# Patient Record
Sex: MALE | Race: WHITE
[De-identification: names, ages, dates, MRNs, and addresses within clinical notes are randomized per-mention and may not be internally consistent; named-entity substitution may affect disease eponyms.]

---

## 2018-06-19 ENCOUNTER — HOSPITAL ENCOUNTER (OUTPATIENT)
Dept: HOSPITAL 17 - CPRE | Age: 78
End: 2018-06-19
Attending: SURGERY
Payer: MEDICARE

## 2018-06-19 DIAGNOSIS — Z01.810: ICD-10-CM

## 2018-06-19 DIAGNOSIS — M79.609: ICD-10-CM

## 2018-06-19 DIAGNOSIS — R00.1: ICD-10-CM

## 2018-06-19 DIAGNOSIS — Z01.812: Primary | ICD-10-CM

## 2018-06-19 LAB
ALBUMIN SERPL-MCNC: 4 GM/DL (ref 3.4–5)
ALP SERPL-CCNC: 76 U/L (ref 45–117)
ALT SERPL-CCNC: 19 U/L (ref 12–78)
AST SERPL-CCNC: 11 U/L (ref 15–37)
BILIRUB SERPL-MCNC: 0.6 MG/DL (ref 0.2–1)
BUN SERPL-MCNC: 13 MG/DL (ref 7–18)
CALCIUM SERPL-MCNC: 8.9 MG/DL (ref 8.5–10.1)
CHLORIDE SERPL-SCNC: 107 MEQ/L (ref 98–107)
COLOR UR: YELLOW
CREAT SERPL-MCNC: 0.89 MG/DL (ref 0.6–1.3)
ERYTHROCYTE [DISTWIDTH] IN BLOOD BY AUTOMATED COUNT: 12.6 % (ref 11.6–17.2)
GFR SERPLBLD BASED ON 1.73 SQ M-ARVRAT: 83 ML/MIN (ref 89–?)
GLUCOSE UR STRIP-MCNC: (no result) MG/DL
HCO3 BLD-SCNC: 24.8 MEQ/L (ref 21–32)
HCT VFR BLD CALC: 39.8 % (ref 39–51)
HGB BLD-MCNC: 13.7 GM/DL (ref 13–17)
HGB UR QL STRIP: (no result)
INR PPP: 1.1 RATIO
KETONES UR STRIP-MCNC: (no result) MG/DL
MCH RBC QN AUTO: 31.4 PG (ref 27–34)
MCHC RBC AUTO-ENTMCNC: 34.4 % (ref 32–36)
MCV RBC AUTO: 91.3 FL (ref 80–100)
MUCOUS THREADS #/AREA URNS LPF: (no result) /LPF
NITRITE UR QL STRIP: (no result)
PLATELET # BLD: 252 TH/MM3 (ref 150–450)
PMV BLD AUTO: 8.1 FL (ref 7–11)
PROT SERPL-MCNC: 7.2 GM/DL (ref 6.4–8.2)
PROTHROMBIN TIME: 10.9 SEC (ref 9.8–11.6)
RBC # BLD AUTO: 4.36 MIL/MM3 (ref 4.5–5.9)
SODIUM SERPL-SCNC: 142 MEQ/L (ref 136–145)
SP GR UR STRIP: 1.01 (ref 1–1.03)
URINE LEUKOCYTE ESTERASE: (no result)
WBC # BLD AUTO: 8 TH/MM3 (ref 4–11)

## 2018-06-19 PROCEDURE — 85610 PROTHROMBIN TIME: CPT

## 2018-06-19 PROCEDURE — 80053 COMPREHEN METABOLIC PANEL: CPT

## 2018-06-19 PROCEDURE — 36415 COLL VENOUS BLD VENIPUNCTURE: CPT

## 2018-06-19 PROCEDURE — 81001 URINALYSIS AUTO W/SCOPE: CPT

## 2018-06-19 PROCEDURE — 85027 COMPLETE CBC AUTOMATED: CPT

## 2018-06-19 PROCEDURE — 85730 THROMBOPLASTIN TIME PARTIAL: CPT

## 2018-06-19 PROCEDURE — 93005 ELECTROCARDIOGRAM TRACING: CPT

## 2018-06-20 NOTE — MH
cc:

IRVIN Rodriguez MD

****

 

 

DATE OF ADMISSION:

06/25/2018

 

ADMITTING DIAGNOSIS:

Osteoarthritic degeneration of left hip, now being admitted for left 

total hip arthroplasty.

 

HISTORY OF PRESENT ILLNESS:

This pleasant 78-year-old male is being admitted today for left total 

hip arthroplasty due to severe painful osteoarthritic degeneration, 

left hip.

 

OTHER PAST HISTORY:

He recently underwent bilateral total knees as well.  He has a history

of hyperlipidemia, essential primary hypertension.

 

CURRENT MEDICATIONS:

Diazepam 5 mg tablet, doxazosin, diltiazem, simvastatin, Prepopik.

 

PREVIOUS SURGERIES:

The total knees.

 

REVIEW OF SYSTEMS:

Noncontributory.

 

FAMILY HISTORY:

Noncontributory.

 

SOCIAL HISTORY:

Does not smoke or drink.

 

ALLERGIES:

HE IS ALLERGIC TO GEMFIBROZIL AND PENICILLIN.

 

PHYSICAL EXAMINATION:

GENERAL:  We find a 78-year-old male, well developed, well nourished, 

oriented x 3, complaining of pain in his left hip.

VITAL SIGNS:  Blood pressure 130/80, pulse 60 and regular, 

respirations 16, temperature 98, pulse oximetry 98% on room air.

HEENT:  Eyes PERRLA, EOMI.  Ears, nose, mouth clear.

NECK:  Supple.

LUNGS:  Clear.

HEART:  Regular rate.

ABDOMEN:  Soft, positive bowel sounds, nontender.

EXTREMITIES:  Reveal his left hip to have decreased range of motion 

with crepitance on range of motion.  Neurovascularly intact to his 

toes.

 

IMPRESSION:

Severe painful osteoarthritic degeneration, left hip.

 

PLAN:

Admission for left total hip arthroplasty today.  The patient given 

prescription for postoperative pain and anticoagulation control in the

office.  Plans on going home with home health care after surgical stay

in the hospital.

 

 

__________________________________

MD JASON Jacques/ISELA

D: 06/20/2018, 03:26 PM

T: 06/20/2018, 03:48 PM

Visit #: [id number]

Job #: [id number]

## 2018-06-20 NOTE — EKG
Date Performed: 06/19/2018       Time Performed: 09:12:36

 

PTAGE:      78 years

 

EKG:      SINUS BRADYCARDIA BORDERLINE ECG 

 

NO PREVIOUS TRACING            

 

DOCTOR:   Mickey Cervantes  Interpretating Date/Time  06/20/2018 14:13:31

## 2018-06-25 ENCOUNTER — HOSPITAL ENCOUNTER (INPATIENT)
Dept: HOSPITAL 17 - HSDI | Age: 78
LOS: 1 days | Discharge: HOME HEALTH | DRG: 470 | End: 2018-06-26
Attending: SURGERY | Admitting: SURGERY
Payer: MEDICARE

## 2018-06-25 VITALS
RESPIRATION RATE: 18 BRPM | DIASTOLIC BLOOD PRESSURE: 79 MMHG | OXYGEN SATURATION: 96 % | TEMPERATURE: 98.5 F | HEART RATE: 81 BPM | SYSTOLIC BLOOD PRESSURE: 155 MMHG

## 2018-06-25 VITALS
RESPIRATION RATE: 18 BRPM | OXYGEN SATURATION: 97 % | DIASTOLIC BLOOD PRESSURE: 81 MMHG | SYSTOLIC BLOOD PRESSURE: 151 MMHG | TEMPERATURE: 98.6 F | HEART RATE: 79 BPM

## 2018-06-25 VITALS — HEIGHT: 71 IN | WEIGHT: 228.4 LBS | BODY MASS INDEX: 31.98 KG/M2

## 2018-06-25 VITALS
TEMPERATURE: 97.4 F | DIASTOLIC BLOOD PRESSURE: 83 MMHG | SYSTOLIC BLOOD PRESSURE: 169 MMHG | OXYGEN SATURATION: 94 % | RESPIRATION RATE: 14 BRPM | HEART RATE: 65 BPM

## 2018-06-25 DIAGNOSIS — E78.5: ICD-10-CM

## 2018-06-25 DIAGNOSIS — Z88.0: ICD-10-CM

## 2018-06-25 DIAGNOSIS — M16.12: Primary | ICD-10-CM

## 2018-06-25 DIAGNOSIS — I10: ICD-10-CM

## 2018-06-25 PROCEDURE — 73501 X-RAY EXAM HIP UNI 1 VIEW: CPT

## 2018-06-25 PROCEDURE — 86850 RBC ANTIBODY SCREEN: CPT

## 2018-06-25 PROCEDURE — 73502 X-RAY EXAM HIP UNI 2-3 VIEWS: CPT

## 2018-06-25 PROCEDURE — C9290 INJ, BUPIVACAINE LIPOSOME: HCPCS

## 2018-06-25 PROCEDURE — C1776 JOINT DEVICE (IMPLANTABLE): HCPCS

## 2018-06-25 PROCEDURE — 86900 BLOOD TYPING SEROLOGIC ABO: CPT

## 2018-06-25 PROCEDURE — 86901 BLOOD TYPING SEROLOGIC RH(D): CPT

## 2018-06-25 PROCEDURE — 0SRB0JA REPLACEMENT OF LEFT HIP JOINT WITH SYNTHETIC SUBSTITUTE, UNCEMENTED, OPEN APPROACH: ICD-10-PCS | Performed by: SURGERY

## 2018-06-25 PROCEDURE — 94150 VITAL CAPACITY TEST: CPT

## 2018-06-25 PROCEDURE — 85014 HEMATOCRIT: CPT

## 2018-06-25 PROCEDURE — L1830 KO IMMOB CANVAS LONG PRE OTS: HCPCS

## 2018-06-25 PROCEDURE — 85018 HEMOGLOBIN: CPT

## 2018-06-25 RX ADMIN — OXYTOCIN SCH MLS/HR: 10 INJECTION, SOLUTION INTRAMUSCULAR; INTRAVENOUS at 10:56

## 2018-06-25 RX ADMIN — OXYTOCIN SCH MLS/HR: 10 INJECTION, SOLUTION INTRAMUSCULAR; INTRAVENOUS at 20:30

## 2018-06-25 RX ADMIN — HYDROCODONE BITARTRATE AND ACETAMINOPHEN PRN TAB: 7.5; 325 TABLET ORAL at 17:07

## 2018-06-25 RX ADMIN — CLINDAMYCIN PHOSPHATE SCH MLS/HR: 150 INJECTION, SOLUTION INTRAMUSCULAR; INTRAVENOUS at 19:00

## 2018-06-25 RX ADMIN — CLINDAMYCIN PHOSPHATE SCH MLS/HR: 150 INJECTION, SOLUTION INTRAMUSCULAR; INTRAVENOUS at 13:00

## 2018-06-25 RX ADMIN — DILTIAZEM HYDROCHLORIDE SCH MG: 240 CAPSULE, EXTENDED RELEASE ORAL at 09:00

## 2018-06-25 RX ADMIN — VITAMIN D, TAB 1000IU (100/BT) SCH UNITS: 25 TAB at 09:00

## 2018-06-25 NOTE — RADRPT
EXAM DATE:  6/25/2018 7:53 AM EDT

AGE/SEX:        78 years / Male



INDICATIONS:  Left hip pain. Pre-op for left hip surgery.



CLINICAL DATA:  This is the patient's initial encounter. Patient reports that signs and symptoms have
 been present for 1 day and indicates a pain score of 7/10. 

                                                                          

MEDICAL/SURGICAL HISTORY:       None. None.



COMPARISON:      No prior exams available for comparison. 





FINDINGS:  

Severe degenerative change with significant joint space narrowing and prominent osteophyte formation 
and subchondral cysts. Osseous structures appear intact. Soft tissues are unremarkable.



CONCLUSION: 

1.  Severe degenerative osteoarthritis of the left hip.



 







Electronically signed by: Leo Persaud MD  6/25/2018 8:02 AM EDT

## 2018-06-25 NOTE — HHI.PR
__________________________________________________





Immediate Post Op Note


Procedure Date:


Jun 25, 2018


Pre Op Diagnosis:  


Osteoarthritic degeneration of left hip


Post Op Diagnosis:  


Osteoarthritic degeneration of left hip


Surgeon:


RENEE Rodriguez MD


Assistant(s):


Ashley BUSH


Procedure:


Left Total Hip Arthroplasty


Complications:


none


Specimen(s) removed:


none


Estimated blood loss:


200 cc


Anesthesia:  General


Drains:  None


IVF


Urinary Output (mLs):  0 (no dillon)


Tourniquet time (min at mmHg)


none


Patient to:  PACU


Patient Condition:  Good


Implant/Devices:  SEE IMPLANT LOG (if applicable)


Date/Time of Procedure:  SEE SURGICAL CARE RECORD











Ashley Espinosa Jun 25, 2018 10:21

## 2018-06-25 NOTE — RADRPT
EXAM DATE:  6/25/2018 11:12 AM EDT

AGE/SEX:        78 years / Male



INDICATIONS:  Post-op left hip replacement.



CLINICAL DATA:  This is the patient's subsequent encounter. Patient reports that signs and symptoms h
ave been present for 1 day and indicates a pain score of 10/10. 

                                                                          

MEDICAL/SURGICAL HISTORY:       Hypertension. None.



COMPARISON:      C, HIP LEFT (AP&LAT 2/3VWS) WO AP PELVIS, 6/25/2018.  . 





FINDINGS:  

Left total hip arthroplasty is present. Hardware is intact. Alignment is anatomic. Adjacent pelvis is
 unremarkable.



CONCLUSION: 

Satisfactory appearance post left VAZQUEZ



 







Electronically signed by: David Whitlock MD  6/25/2018 11:18 AM EDT

## 2018-06-25 NOTE — MP
cc:

IRVIN Rodriguez MD

****

 

 

DATE OF OPERATION:

06/25/2018

 

PREOPERATIVE DIAGNOSIS:

Osteoarthritic degeneration, left hip.

 

POSTOPERATIVE DIAGNOSIS:

Osteoarthritic degeneration, left hip.

 

SURGERY PERFORMED:

Left total hip arthroplasty using Aesculap component size 17 standard 

stem with a short neck, 36 mm head, size 58 cup with one 32 mm screw. 

No cement utilized.

 

SURGEON:

IRVIN Rodriguez MD

 

ASSISTANT:

ELEAZAR Hackett

 

ANESTHESIA:

General intubation.

 

PROCEDURE:

The patient was brought to the Operating Room, where after successful 

induction of spinal anesthesia was placed on the operating room table 

in the right lateral decubitus position.  The left hip, thigh and leg 

were prepped and draped in the usual manner.  A posterolateral 

approach was then utilized by making an incision over the proximal 

portion of the femur lateral aspect, carried across the greater 

trochanter, carried posterior in a curved incision toward the buttock.

 The incision was carried down through the subcutaneous tissue, 

through the fibers of the tensor fascia jesika and gluteus diana to 

expose the greater trochanteric bursa.  This was then removed by sharp

and blunt dissection.  The hip was then internally rotated to expose 

the insertions of the short external rotators of the hip and were 

incised at their insertion into the greater trochanter and reflected 

posterior to protect the sciatic nerve.  These were held with a 

Charnley retractor to better visualize the hip joint.  The capsule was

identified and removed by sharp dissection.  The hip was then 

dislocated by internal rotation and flexion of the hip.

 

The femoral calcar was then measured using the trial components for 

the appropriate length cut of the neck using an oscillating saw.  

After the cut was made the head was removed.  The acetabulum was then 

approached and measured, the acetabulum reamed with the acetabular 

reamers.  Next, the femoral calcar was approached by first inserting a

canal finder followed by rigid reamers, followed by a cookie-cutter to

the appropriate size, in this case being a #15.  The broach was left 

in place and a planer used to plane the calcar to a smooth finish.  

The broach was then removed.  The trial components were then inserted 

into place, the hip reduced, found to track smoothly with no evidence 

of subluxation or dislocation.

 

All trial components were removed.  The wound was irrigated copiously 

with antibiotic solution and Water Pik.  The actual components were 

then inserted and impacted into place using Aesculap component size 17

standard stem with a short neck, 36 mm head, size 58 cup with one 32 

mm screw.  No cement utilized.  The hip was reduced, found to track 

smoothly with no evidence of subluxation or dislocation.

 

The wound was irrigated copiously with antibiotic solution, meticulous

hemostasis achieved.  No drain utilized.  The sciatic nerve identified

and protected throughout the procedure.  A 60 mL of mixture of Exparel

with 0.25% Marcaine plain and normal saline inserted around the 

anterior hip joint for extra pain control.  The capsule approximated 

with interrupted #1 Vicryl suture.  The deep fascia approximated with 

running #2 Quill.  Subcutaneous tissue approximated using interrupted 

and running 2-0 and 3-0 Monocryl suture and Prineo dressing, abduction

pillow, splint and knee immobilizer.

 

ESTIMATED BLOOD LOSS:

300 mL.

 

Sponge and suture counts correct.

 

The patient tolerated the procedure well and left the operating room 

in satisfactory condition and ELEAZAR Hackett, was present during 

the entire procedure to include patient positioning and the procedure.

 The medical necessity and nurse practitioner as a first assistant was

indicated in this case due to the surgical complexity of the case 

itself.  During the surgical case, the surgical tech was working the 

back table while my surgical first assistant, ELEAZAR was directly 

assisting me.

 

 

__________________________________

J. Hamlet Rodriguez MD

 

 

JRJONES/HUBERT

D: 06/25/2018, 10:01 AM

T: 06/25/2018, 10:19 AM

Visit #: [id number]

Job #: [id number]

## 2018-06-25 NOTE — HHI.FF
Face to Face Verification


Diagnosis:  


(1) Status post total replacement of left hip


Physical Therapy


Gait training


Hip:  Total hip, Protocol: Left, Posterior hip precautions, Abduction pillow 

while in bed, Progress to weight bearing


Canvas Knee Splint:  When in bed & 2 pillows btw thighs





Nursing


RN:  3 days/week x 2 weeks


Dressing Changes:  Do not change dressing











I have seen patient Evaristo Peters on 6/25/18. My clinical findings support 

the need for the requested home health care services because:








 Limited ability to care for self





 High risk of falls














I certify that my clinical findings support that this patient is homebound 

because:








 Unsteady gait/balance

















IRVIN Rodriguez MD Jun 25, 2018 07:49

## 2018-06-26 VITALS
RESPIRATION RATE: 18 BRPM | TEMPERATURE: 97.9 F | SYSTOLIC BLOOD PRESSURE: 137 MMHG | DIASTOLIC BLOOD PRESSURE: 66 MMHG | OXYGEN SATURATION: 98 % | HEART RATE: 73 BPM

## 2018-06-26 VITALS
HEART RATE: 77 BPM | TEMPERATURE: 97.3 F | SYSTOLIC BLOOD PRESSURE: 149 MMHG | RESPIRATION RATE: 18 BRPM | OXYGEN SATURATION: 96 % | DIASTOLIC BLOOD PRESSURE: 78 MMHG

## 2018-06-26 VITALS
OXYGEN SATURATION: 97 % | SYSTOLIC BLOOD PRESSURE: 140 MMHG | TEMPERATURE: 97.8 F | HEART RATE: 90 BPM | DIASTOLIC BLOOD PRESSURE: 78 MMHG | RESPIRATION RATE: 18 BRPM

## 2018-06-26 VITALS — OXYGEN SATURATION: 96 %

## 2018-06-26 LAB
HCT VFR BLD CALC: 34.3 % (ref 39–51)
HGB BLD-MCNC: 11.9 GM/DL (ref 13–17)

## 2018-06-26 RX ADMIN — OXYTOCIN SCH MLS/HR: 10 INJECTION, SOLUTION INTRAMUSCULAR; INTRAVENOUS at 09:00

## 2018-06-26 RX ADMIN — CLINDAMYCIN PHOSPHATE SCH MLS/HR: 150 INJECTION, SOLUTION INTRAMUSCULAR; INTRAVENOUS at 01:01

## 2018-06-26 RX ADMIN — VITAMIN D, TAB 1000IU (100/BT) SCH UNITS: 25 TAB at 09:52

## 2018-06-26 RX ADMIN — CLINDAMYCIN PHOSPHATE SCH MLS/HR: 150 INJECTION, SOLUTION INTRAMUSCULAR; INTRAVENOUS at 06:35

## 2018-06-26 RX ADMIN — DILTIAZEM HYDROCHLORIDE SCH MG: 240 CAPSULE, EXTENDED RELEASE ORAL at 09:52

## 2018-06-26 RX ADMIN — HYDROCODONE BITARTRATE AND ACETAMINOPHEN PRN TAB: 7.5; 325 TABLET ORAL at 12:16

## 2018-06-26 NOTE — PD.ORT.PN
Subjective


Subjective Remarks


Patient comfortable without complaints.





Objective


Vitals





Vital Signs








  Date Time  Temp Pulse Resp B/P (MAP) Pulse Ox O2 Delivery O2 Flow Rate FiO2


 


6/26/18 08:58     96   21


 


6/26/18 08:00 97.9 73 18 137/66 (89) 98   


 


6/26/18 05:00 97.3 77 18 149/78 (101) 96   


 


6/25/18 23:01 98.5 81 18 155/79 (104) 96   


 


6/25/18 21:29        21


 


6/25/18 20:20 98.6 79 18 151/81 (104) 97   


 


6/25/18 18:10   18     


 


6/25/18 17:30 97.4 65 14 169/83 (111) 94   


 


6/25/18 15:28 98.1 58 16 130/75 (93) 94 Room Air  


 


6/25/18 14:45  62 15 145/73 (97) 95 Room Air  


 


6/25/18 13:45  61 15 127/84 (98) 100 Room Air  


 


6/25/18 13:15  59 15 123/63 (83) 100 Room Air  


 


6/25/18 12:45  62 15 143/76 (98) 100 Nasal Cannula 3 


 


6/25/18 12:15  56 15 132/66 (88) 100 Nasal Cannula 3 


 


6/25/18 11:45  51 15 128/61 (83) 97 Nasal Cannula 3 














I/O      


 


 6/25/18 6/25/18 6/25/18 6/26/18 6/26/18 6/26/18





 07:00 15:00 23:00 07:00 15:00 23:00


 


Intake Total  1400 ml 850 ml 480 ml  


 


Output Total  3300 ml 525 ml 600 ml  


 


Balance  -1900 ml 325 ml -120 ml  


 


      


 


Intake Oral   425 ml 480 ml  


 


IV Total  1400 ml 425 ml   


 


Output Urine Total   525 ml 600 ml  


 


Estimated Blood Loss  300 ml    


 


Other  3000 ml    


 


# Voids   3   


 


# Bowel Movements   1 0  








Result Diagram:  


6/26/18 0612





Imaging





Last 48 hours Impressions








Hip X-Ray 6/25/18 0742 Signed





Impressions: 





 CONCLUSION: 





 Satisfactory appearance post left VAZQUEZ





  





 





  





  





 





  





 





  





 


 


Hip X-Ray 6/25/18 0000 Signed





Impressions: 





 CONCLUSION: 





 1.  Severe degenerative osteoarthritis of the left hip.





  





 





  





  





 





  





 





  





 








Objective Remarks


Sitting up in chair. NV intact to toes. No calf tenderness.





Assessment & Plan


Ortho Post Op Day #:  1


Problem List:  


Assessment and Plan


Home today after class.











IRVIN Rodriguez MD Jun 26, 2018 11:45